# Patient Record
(demographics unavailable — no encounter records)

---

## 2024-11-04 NOTE — PLAN
[FreeTextEntry1] : 47 y/o female presents to office with AUB -gyn sono done today reveals R ovary bilocated with internal echos hemorrhagic likely with an echogenic structure likely to be a clot. L ovary biloculated larger simple cyst component measuring 2.9 x 2.5x 2.2cm. Second component hemorrhagic like with internal echos measuring 2.1 x 1.9 x 1.5cm. Pedunculated anterior fibroid EMILY measuring 4.4cm. see official sonogram for further information   -patient had tumor markers drawn,  was slightly elevated 40  -referral given to GYN ONC due to bilateral complex cysts unchanged, slightly elevated tumor markers and AUB.  - discussed she can have the EMB done in our office or have done with GYN ONC.

## 2024-11-04 NOTE — PROCEDURE
[F/U Abnormal US] : f/u abnormal ultrasound [FreeTextEntry4] : bilateral complex hemorrhagic cysts, 4.4cm anterior EMILY fibroid - similar to prior U/S see official report for further information

## 2024-11-04 NOTE — HISTORY OF PRESENT ILLNESS
[FreeTextEntry1] : Patient is a 47 y/o LMP 10/16/24 presenting with abnormal uterine bleeding. She has been experiencing AUB for several months. Patient reports she gets her menses monthly, however then spots in between cycles. Patient denies any pain or pressure. Last U/S in our office revealed bilateral complex ovarian cysts, today U/S similar to last u/s.  [Irregular Menstrual Interval] : irregular menstrual interval [TextBox_28] : inter-cycle spotting

## 2025-01-16 NOTE — HISTORY OF PRESENT ILLNESS
[N] : Patient does not use contraception [Monogamous (Male Partner)] : is monogamous with a male partner [Y] : Positive pregnancy history [Mammogramdate] : 12/2023 [BreastSonogramDate] : 12/2023 [LMPDate] : 01/06/2025 [MensesFreq] : 30 [MensesLength] : 5 [PGHxTotal] : 4 [[facility] : [facility] [PGHxPremature] : 0 [PGHxAbortions] : 2 [[facility]] : 2 [FreeTextEntry1] : NSVDx2  [Currently Active] : currently active [Men] : men [Vaginal] : vaginal [No] : No

## 2025-01-16 NOTE — PHYSICAL EXAM
[Chaperone Present] : A chaperone was present in the examining room during all aspects of the physical examination [[id number]] : A chaperone was present during the pelvic exam. [FreeTextEntry2] : Laquita [Appropriately responsive] : appropriately responsive [Alert] : alert [No Acute Distress] : no acute distress [No Lymphadenopathy] : no lymphadenopathy [Soft] : soft [Non-tender] : non-tender [Non-distended] : non-distended [No HSM] : No HSM [No Lesions] : no lesions [No Mass] : no mass [Oriented x3] : oriented x3 [Examination Of The Breasts] : a normal appearance [Breast Palpation Diffuse Fibrous Tissue Bilateral] : fibrocystic changes [No Masses] : no breast masses were palpable [Labia Majora] : normal [Labia Minora] : normal [Normal] : normal [Uterine Adnexae] : normal